# Patient Record
Sex: MALE | Race: BLACK OR AFRICAN AMERICAN
[De-identification: names, ages, dates, MRNs, and addresses within clinical notes are randomized per-mention and may not be internally consistent; named-entity substitution may affect disease eponyms.]

---

## 2017-03-08 ENCOUNTER — HOSPITAL ENCOUNTER (EMERGENCY)
Dept: HOSPITAL 61 - ER | Age: 37
Discharge: HOME | End: 2017-03-08
Payer: SELF-PAY

## 2017-03-08 VITALS — BODY MASS INDEX: 25.01 KG/M2 | WEIGHT: 165 LBS | HEIGHT: 68 IN

## 2017-03-08 VITALS — SYSTOLIC BLOOD PRESSURE: 136 MMHG | DIASTOLIC BLOOD PRESSURE: 81 MMHG

## 2017-03-08 DIAGNOSIS — F14.10: ICD-10-CM

## 2017-03-08 DIAGNOSIS — J40: Primary | ICD-10-CM

## 2017-03-08 DIAGNOSIS — F12.10: ICD-10-CM

## 2017-03-08 PROCEDURE — 84484 ASSAY OF TROPONIN QUANT: CPT

## 2017-03-08 PROCEDURE — 93005 ELECTROCARDIOGRAM TRACING: CPT

## 2017-03-08 PROCEDURE — 71020: CPT

## 2017-03-08 PROCEDURE — 99284 EMERGENCY DEPT VISIT MOD MDM: CPT

## 2017-03-08 NOTE — RAD
PA and lateral chest radiographs 3/8/2017.



Clinical History: Cough and chest pain.. 



PA and lateral digital radiographs of the chest were obtained. No previous

studies are available for comparison. The cardiac and mediastinal silhouettes

are within normal limits in size and configuration. No pulmonary infiltrate is

seen. No pleural effusion or pneumothorax is noted. The osseous structures are

grossly intact.



Impression: No radiographic evidence of active cardiopulmonary disease.

## 2017-03-08 NOTE — EKG
[facility]

               [address] [place] [address]

Test Date:    2017               Test Time:    12:14:42

Pat Name:     PATSY RAMIREZ             Department:   

Patient ID:   [id number]           Room:          

Gender:                               Technician:   

:          1980               Requested By: ANY GUZMAN

Order Number: [id number]            Reading MD:     

                                 Measurements

Intervals                              Axis          

Rate:         67                       P:            38

MS:           166                      QRS:          51

QRSD:         92                       T:            38

QT:           370                                    

QTc:          394                                    

                           Interpretive Statements

SINUS RHYTHM

OTHERWISE NORMAL ECG

RI6.01          Unconfirmed report

No previous ECG available for comparison

## 2017-03-08 NOTE — PHYS DOC
Past Medical History


Past Medical History:  No Pertinent History


Past Surgical History:  Appendectomy


Alcohol Use:  Occasionally


Drug Use:  Cocaine, Marijuana





Adult General


Chief Complaint


Chief Complaint:  COUGH





[facility]


HPI


Patient is a 36  year old male who presents with productive cough for 2 weeks. 

He also reports nasal congestion. Yesterday he began to notice left-sided chest 

pain that radiated to the left arm. The pain was worse with his cough. He 

denies fever, shortness of breath, sore throat, or ear pain. He admits to 

smoking crack, last used a few days ago. He does not have a PCP.





Review of Systems


Review of Systems


Constitutional: Denies fever or chills. []


Eyes: Denies change in visual acuity, redness, or eye pain. []


HENT: Denies ear pain or sore throat. Reports nasal congestion.


Respiratory: Denies shortness of breath. Reports productive cough.


Cardiovascular: Denies palpitations or edema. Reports chest pain.


GI: Denies abdominal pain, nausea, vomiting, bloody stools or diarrhea. []


: Denies dysuria, hematuria or urinary frequency. []


Musculoskeletal: Denies back pain or joint pain. []


Integument: Denies rash or skin lesions. []


Neurologic: Denies headache, focal weakness or sensory changes. []


Endocrine: Denies polyuria or polydipsia. []


Psych: Denies anxiety or depression. []





All systems reviewed and negative unless otherwise stated in the HPI.





Allergies


Allergies





 Allergies








Coded Allergies Type Severity Reaction Last Updated Verified


 


  No Known Drug Allergies    11/20/15 No











Physical Exam


Physical Exam





Constitutional: Well developed, well nourished, no acute distress, non-toxic 

appearance. []


HENT: Normocephalic, atraumatic, bilateral external ears normal, oropharynx 

moist, no oral exudates, nose normal. Bilateral TMs without erythema or 

bulging. There is no posterior pharyngeal erythema or tonsillar edema. 

Bilateral nasal turbinates are significantly swollen and erythematous with 

purulent drainage.


Eyes: PERRLA, EOMI, conjunctiva normal, no discharge. [] 


Neck: Normal range of motion, no tenderness, supple, no stridor. [] 


Cardiovascular: Heart rate regular rhythm, no murmur []


Lungs & Thorax:  Bilateral breath sounds clear to auscultation without wheezes, 

rales, or rhonchi.


Skin: Warm, dry, no erythema, no rash. [] 


Neurologic: Alert and oriented X 3, normal motor function, normal sensory 

function, no focal deficits noted. []


Psychologic: Affect normal, judgement normal, mood normal. []





Current Patient Data


Vital Signs





 Vital Signs








  Date Time  Temp Pulse Resp B/P Pulse Ox O2 Delivery O2 Flow Rate FiO2


 


3/8/17 12:00 97.7 85 18  100 Room Air  





 97.7       











EKG


EKG


EKG at 1214. Heart rate 67 bpm. Sinus rhythm without acute ischemic changes or 

STEMI, as interpreted by Dr. Toscano.





Radiology/Procedures


Radiology/Procedures


REASON: cough, chest pain


PROCEDURE: CHEST PA & LATERAL





PA and lateral chest radiographs 3/8/2017.





Clinical History: Cough and chest pain.. 





PA and lateral digital radiographs of the chest were obtained. No previous


studies are available for comparison. The cardiac and mediastinal silhouettes


are within normal limits in size and configuration. No pulmonary infiltrate is


seen. No pleural effusion or pneumothorax is noted. The osseous structures are


grossly intact.





Impression: No radiographic evidence of active cardiopulmonary disease.





Course & Med Decision Making


Course & Med Decision Making


Pertinent Labs and Imaging studies reviewed. (See chart for details)





Patient with history of cocaine use presents with cough and nasal congestion 

for 2 weeks with chest pain that started yesterday. On exam, his lungs are 

clear and heart rate and rhythm are regular. EKG shows a normal sinus rhythm. 

Chest x-ray does not show any focal infiltrates. I-STAT troponin is negative. 

He is discharged home with prescription for albuterol inhaler and prednisone. 

Return precautions were discussed. He verbalizes understanding and agrees with 

plan.





Dragon Disclaimer


Dragon Disclaimer


This electronic medical record was generated, in whole or in part, using a 

voice recognition dictation system.





Departure


Departure


Impression:  


 Primary Impression:  


 Bronchitis


Disposition:  01 HOME, SELF-CARE


Condition:  STABLE


Referrals:  


NO PCP (PCP)


Patient Instructions:  Acute Bronchitis, Easy-to-Read





Additional Instructions:


Your EKG, chest xray, and labs were normal today. 


You appear to have bronchitis, which is a viral infection. Antibiotics do not 

help to treat viruses.


Please complete all of the prescribed steroids, even if you are feeling better.


Please use the prescribed inhaler as needed for cough or shortness of breath. 

Do not use more often than directed.


Please follow up with a primary care provider within the next week. 


Return to the emergency department if you have any new or concerning symptoms.


Scripts


Albuterol Sulfate (Proair Hfa Inhaler)8.5 Gm Hfa.aer.ad1 Puff INH Q4HRS PRN 

SHORTNESS OF BREATH #1 INHALER


   Prov:ANY GUZMAN         3/8/17


Prednisone 20 Mg [id number] Mg PO DAILY 5 Days


   Prov:ANY GUZMAN         3/8/17


Benzonatate 200 Mg Capsule1 Cap PO TID #30 CAP


   Prov:ANY GUZMAN         3/8/17








ANY GUZMAN Mar 8, 2017 13:34

## 2018-10-03 ENCOUNTER — HOSPITAL ENCOUNTER (EMERGENCY)
Dept: HOSPITAL 61 - ER | Age: 38
Discharge: HOME | End: 2018-10-03
Payer: SELF-PAY

## 2018-10-03 VITALS — SYSTOLIC BLOOD PRESSURE: 136 MMHG | DIASTOLIC BLOOD PRESSURE: 67 MMHG

## 2018-10-03 VITALS — WEIGHT: 175 LBS | HEIGHT: 68 IN | BODY MASS INDEX: 26.52 KG/M2
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
## 2018-10-03 DIAGNOSIS — Z90.89: ICD-10-CM

## 2018-10-03 DIAGNOSIS — S00.83XA: Primary | ICD-10-CM
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
## 2018-10-03 DIAGNOSIS — S16.1XXA: ICD-10-CM

## 2018-10-03 PROCEDURE — 99283 EMERGENCY DEPT VISIT LOW MDM: CPT
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
